# Patient Record
Sex: MALE
[De-identification: names, ages, dates, MRNs, and addresses within clinical notes are randomized per-mention and may not be internally consistent; named-entity substitution may affect disease eponyms.]

---

## 2018-11-11 ENCOUNTER — HOSPITAL ENCOUNTER (EMERGENCY)
Dept: HOSPITAL 14 - H.ER | Age: 28
Discharge: HOME | End: 2018-11-11
Payer: COMMERCIAL

## 2018-11-11 VITALS
TEMPERATURE: 97.6 F | RESPIRATION RATE: 19 BRPM | HEART RATE: 105 BPM | SYSTOLIC BLOOD PRESSURE: 124 MMHG | OXYGEN SATURATION: 98 % | DIASTOLIC BLOOD PRESSURE: 86 MMHG

## 2018-11-11 DIAGNOSIS — S09.90XA: ICD-10-CM

## 2018-11-11 DIAGNOSIS — S00.83XA: Primary | ICD-10-CM
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
## 2018-11-11 NOTE — CT
Date of service: 



11/11/2018



PROCEDURE:  CT MAXILLOFACIAL BONES WITHOUT CONTRAST



HISTORY:

facial injury



COMPARISON:

CT head same day



TECHNIQUE:

Contiguous axial CT  images of the maxillofacial bones were obtained. 

Coronal and sagittal reformats were generated.



Radiation dose:



Total exam DLP = 864.82 mGy-cm.



This CT exam was performed using one or more of the following dose 

reduction techniques: Automated exposure control, adjustment of the 

mA and/or kV according to patient size, and/or use of iterative 

reconstruction technique.



FINDINGS:



NASAL BONES:

There is the suggestion of some irregularity of the left anterior 

nasal bone region.  This appears to represent fracture, although age 

of the fracture is difficult to ascertain.  This may reflect prior 

chronic fracture or recent fracture.  There is additionally areas of 

angulation and irregularity of the nasal septum with some overlying 

soft tissue swelling.  Fracture of the nasal septum is also suspected 

although this could also once again be chronic in origin.  There is 

some adjacent soft tissue swelling and soft tissue within the left 

nasal cavity.  Left-sided nasal septal spur is also noted.



ORBITS:

Bony orbits are intact.  No orbital floor fracture or irregularity is 

seen.  No zygomatic or frontal suture diastases or zygoma fracture is 

noted.



PARANASAL SINUSES/ MASTOIDS:

There is evidence of rounded soft tissue in the in fear ear left 

maxillary sinus consistent with mucous retention cysts.  There also 

areas of mild mucosal thickening and some mild opacification within 

the ethmoid air cell region.  There is once again evidence of some 

nonspecific soft tissue within the left nasal cavity which may 

reflect blood products given the recent trauma and should be 

correlated clinically with physical exam.  Mild soft tissue swelling 

is seen overlying the left anterior maxilla.  Left maxilla is intact. 

 Pterygoid is are intact.  Visualized sphenoid sinus is unremarkable. 

 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  Right maxillary sinus 

shows minimal mucosal thickening.



MAXILLA:

Anterior maxilla bones are intact although once again there is some 

soft tissue swelling overlying the left anterior maxilla.  Please see 

above for sinus evaluation.



MANDIBLE/ TEMPOROMANDIBULAR JOINTS:

Mandibles are intact.  No temporomandibular joint abnormality is 

seen.  No appreciable tooth fracture is noted.



SKULL BASE:

Skullbase region is intact.



TEMPORAL BONES:

Mastoid air cells and middle ear cavity regions are unremarkable.  No 

temporal bone fracture is seen.



OTHER FINDINGS:

None.



IMPRESSION:

Anterior left maxilla soft tissue swelling and edema.  No appreciable 

fracture of the underlying left maxilla is noted.  Portions of the 

soft tissue swelling lie along the left side of the nasal bone.  

There is some mild irregularity and discontinuity of a portion of the 

left anterior nasal bone as well as angulation and possible 

irregularity of the nasal septum which appear to represent 

age-indeterminate fractures, possibly recent.  There appears to be 

some soft tissue within the left nasal cavity which may reflect blood 

products but could represent chronic mucosal changes.  Correlation 

with the patient's physical exam and symptoms would be suggested. 



Mucous retention cyst in the left maxillary sinus.  Mild ethmoid air 

cell disease.
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
## 2018-11-11 NOTE — CT
Date of service: 



11/11/2018



PROCEDURE:  CT HEAD WITHOUT CONTRAST.



HISTORY:

head injury



COMPARISON:

None available.



TECHNIQUE:

Axial computed tomography images were obtained through the head/brain 

without intravenous contrast.  



Radiation dose:



Total exam DLP = 987.95 mGy-cm.



This CT exam was performed using one or more of the following dose 

reduction techniques: Automated exposure control, adjustment of the 

mA and/or kV according to patient size, and/or use of iterative 

reconstruction technique.



FINDINGS:



HEMORRHAGE:

No intracranial hemorrhage. 



BRAIN:

No mass effect or edema.  No atrophy or chronic microvascular 

ischemic changes.



VENTRICLES:

Unremarkable. No hydrocephalus. 



CALVARIUM:

Unremarkable.



PARANASAL SINUSES:

Minor mucosal thickening.



MASTOID AIR CELLS:

Unremarkable as visualized. No inflammatory changes.



OTHER FINDINGS:

None.



IMPRESSION:

Unremarkable CT scan of the head.  No evidence of intracranial 

hemorrhage or extra-axial collection.